# Patient Record
Sex: MALE | Race: WHITE
[De-identification: names, ages, dates, MRNs, and addresses within clinical notes are randomized per-mention and may not be internally consistent; named-entity substitution may affect disease eponyms.]

---

## 2023-04-07 PROBLEM — Z00.00 ENCOUNTER FOR PREVENTIVE HEALTH EXAMINATION: Status: ACTIVE | Noted: 2023-04-07

## 2023-04-10 ENCOUNTER — APPOINTMENT (OUTPATIENT)
Dept: ORTHOPEDIC SURGERY | Facility: CLINIC | Age: 48
End: 2023-04-10
Payer: COMMERCIAL

## 2023-04-10 VITALS — WEIGHT: 230 LBS | HEIGHT: 69 IN | BODY MASS INDEX: 34.07 KG/M2

## 2023-04-10 DIAGNOSIS — Z78.9 OTHER SPECIFIED HEALTH STATUS: ICD-10-CM

## 2023-04-10 PROCEDURE — 73010 X-RAY EXAM OF SHOULDER BLADE: CPT | Mod: RT

## 2023-04-10 PROCEDURE — 73030 X-RAY EXAM OF SHOULDER: CPT | Mod: RT

## 2023-04-10 PROCEDURE — 99204 OFFICE O/P NEW MOD 45 MIN: CPT

## 2023-04-10 RX ORDER — TIRZEPATIDE 12.5 MG/.5ML
12.5 INJECTION, SOLUTION SUBCUTANEOUS
Refills: 0 | Status: ACTIVE | COMMUNITY

## 2023-04-11 NOTE — HISTORY OF PRESENT ILLNESS
[de-identified] : The patient is a 47 year  old right hand dominant male who presents today complaining of right shoulder pain.  \par Date of Injury/Onset: 1/1/23\par Pain:    At Rest: 0/10 \par With Activity:  4-5/10 \par Mechanism of injury: gradual onset, no specific BRANDEN\par This is not a Work Related Injury being treated under Worker's Compensation.\par This is not an athletic injury occurring associated with an interscholastic or organized sports team.\par Quality of symptoms: dull, soreness\par Improves with: ice, rest\par Worse with: tennis, repetitive overhead motions\par Prior treatment: none\par Prior Imaging: none\par Out of work/sport: currently working\par School/Sport/Position/Occupation: physician \par Additional Information: None\par

## 2023-04-11 NOTE — IMAGING
[Right] : right shoulder [There are no fractures, subluxations or dislocations. No significant abnormalities are seen] : There are no fractures, subluxations or dislocations. No significant abnormalities are seen [de-identified] : The patient is a well appearing 47 year  old male of their stated age. \par Neck is supple & nontender to palpation. Negative Spurling's test. \par \par Effected Shoulder: RIGHT \par Inspection: \par Scapula Winging: Negative \par Deformity: None \par Erythema: None \par Ecchymosis: None \par Abrasions: None \par Effusion: None \par \par Range of Motion: \par Active Forward Flexion: 180 degrees  \par Active Abduction: 180 degrees  \par Passive Forward Flexion: 180 degrees  \par Passive Abduction: 180 degrees  \par ER @ 90 degrees: 90 degrees \par IR @ 90 degrees: 25 degrees \par ER @ 0 degrees: 50 degrees \par Motor Exam: \par Forward Flexion: 5 out of 5 \par Flexion Plane of Scapula: 5 out of 5 \par Abduction: 5 out of 5 \par Internal Rotation: 5 out of 5 \par External Rotation: 5 out of 5 \par Distal Motor Strength: 5 out of 5 \par \par Stability Testing: \par Anterior: 1+ \par Posterior: 1+ \par Sulcus N: 1+ \par Sulcus ER: 1+ \par Provocative Tests: \par Drop Arm: Negative \par Impingement: POSITIVE \par [place]: Negative \par X-Arm Adduction: Negative \par Belly Press: Negative \par Bear Hug: Negative \par Lift Off: Negative \par Apprehension: Negative \par Relocation: Negative \par Posterior Load & Shift: Negative \par \par Palpation: \par AC Joint: Nontender \par Clavicle: Nontender \par SC Joint: Nontender \par Bicepital Groove: TENDER \par Coracoid Process: Nontender \par Pectoralis Minor Tendon: TENDER \par Pectoralis Major Tendon: Nontender & palpably intact \par Latissimus Dorsi: Nontender  \par Proximal Humerus: Nontender \par Scapula Body: Nontender \par Medial Scapula Boarder: Nontender \par Scapula Spine: Nontender \par \par Neurologic Exam: Sensation to Light Touch: \par Axillary: Grossly intact \par Ulnar: Grossly intact \par Radial: Grossly intact \par Median: Grossly intact \par Other:  N/A \par Circulatory/Pulses: \par Ulnar: 2+ \par Radial: 2+ \par Other Pertinent Findings: None \par \par Contralateral Shoulder \par Range of Motion: \par Active Forward Flexion: 180 degrees  \par Active Abduction: 180 degrees  \par Passive Forward Flexion: 180 degrees \par Passive Abduction: 180 degrees  \par ER @ 90 degrees: 90 degrees \par IR @ 90 degrees: 45 degrees \par ER @ 0 degrees: 50 degrees \par Motor Exam: \par Forward Flexion: 5 out of 5 \par Flexion Plane of Scapula: 5 out of 5 \par Abduction: 5 out of 5 \par Internal Rotation: 5 out of 5 \par External Rotation: 5 out of 5 \par Distal Motor Strength: 5 out of 5 \par Stability Testing: \par Anterior: 1+ \par Posterior: 1+ \par Sulcus N: 1+ \par Sulcus ER: 1+ \par Other Pertinent Findings: None \par \par Assessment: The patient is a 47 year old male with right shoulder pain and radiographic and physical exam findings consistent with internal impingement syndrome.   \par The patient’s condition is acute \par Documents/Results Reviewed Today: X-Ray right shoulder/scapula \par Tests/Studies Independently Interpreted Today: X-Ray right shoulder/scapula is benign\par Pertinent findings include: IR 25, +impingement, tender bicipital groove, tender pectoralis minor \par Confounding medical conditions/concerns: None\par \par Plan: Patient will start physical therapy, HEP, and stretching. Discussed taking OTC antiinflammatories as needed - use as directed. Modify activity as discussed. \par Tests Ordered: None \par Prescription Medications Ordered: Discussed use of OTC NSAIDs including but not limited to Aleve, Motrin, Tylenol Advil, etc.\par Braces/DME Ordered: None \par Activity/Work/Sports Status: None \par Additional Instructions: None\par Follow-Up: 6 weeks \par  \par The patient's current medication management of their orthopedic diagnosis was reviewed today:\par (1) We discussed a comprehensive treatment plan that included possible pharmaceutical management involving the use of prescription strength medications including but not limited to options such as oral Naprosyn 500mg BID, once daily Meloxicam 15 mg, or 500-650 mg Tylenol versus over the counter oral medications and topical prescription NSAID Pennsaid vs over the counter Voltaren gel.  Based on our extensive discussion, the patient declined prescription medication and will use over the counter Advil, Alleve, Voltaren Gel or Tylenol as directed.\par (2) There is a moderate risk of morbidity with further treatment, especially from use of prescription strength medications and possible side effects of these medications which include upset stomach with oral medications, skin reactions to topical medications and cardiac/renal issues with long term use.\par (3) I recommended that the patient follow-up with their medical physician to discuss any significant specific potential issues with long term medication use such as interactions with current medications or with exacerbation of underlying medical comorbidities.\par (4) The benefits and risks associated with use of injectable, oral or topical, prescription and over the counter anti-inflammatory medications were discussed with the patient. The patient voiced understanding of the risks including but not limited to bleeding, stroke, kidney dysfunction, heart disease, and were referred to the black box warning label for further information. \par \par Dominique ROMERO attest that this documentation has been prepared under the direction and in the presence of Provider Dr. Vikram Steinberg. \par \par The documentation recorded by the scribe accurately reflects the services I, Dr. Vikram Steinberg, personally performed and the decisions made by me.\par  [FreeTextEntry1] : X-Ray right shoulder/scapula is benign

## 2023-07-12 ENCOUNTER — NON-APPOINTMENT (OUTPATIENT)
Age: 48
End: 2023-07-12

## 2023-07-17 ENCOUNTER — FORM ENCOUNTER (OUTPATIENT)
Age: 48
End: 2023-07-17

## 2023-07-18 ENCOUNTER — APPOINTMENT (OUTPATIENT)
Dept: MRI IMAGING | Facility: CLINIC | Age: 48
End: 2023-07-18
Payer: COMMERCIAL

## 2023-07-18 DIAGNOSIS — M75.41 IMPINGEMENT SYNDROME OF RIGHT SHOULDER: ICD-10-CM

## 2023-07-18 DIAGNOSIS — M25.511 PAIN IN RIGHT SHOULDER: ICD-10-CM

## 2023-07-18 PROCEDURE — 73221 MRI JOINT UPR EXTREM W/O DYE: CPT | Mod: RT

## 2023-07-21 ENCOUNTER — TRANSCRIPTION ENCOUNTER (OUTPATIENT)
Age: 48
End: 2023-07-21

## 2025-04-30 ENCOUNTER — APPOINTMENT (OUTPATIENT)
Dept: ORTHOPEDIC SURGERY | Facility: CLINIC | Age: 50
End: 2025-04-30
Payer: COMMERCIAL

## 2025-04-30 VITALS — HEIGHT: 69 IN | WEIGHT: 230 LBS | BODY MASS INDEX: 34.07 KG/M2

## 2025-04-30 DIAGNOSIS — M70.61 TROCHANTERIC BURSITIS, RIGHT HIP: ICD-10-CM

## 2025-04-30 PROCEDURE — J3490M: CUSTOM

## 2025-04-30 PROCEDURE — 99213 OFFICE O/P EST LOW 20 MIN: CPT | Mod: 25

## 2025-04-30 PROCEDURE — 73503 X-RAY EXAM HIP UNI 4/> VIEWS: CPT | Mod: RT

## 2025-04-30 PROCEDURE — 99204 OFFICE O/P NEW MOD 45 MIN: CPT | Mod: 25

## 2025-04-30 PROCEDURE — 20610 DRAIN/INJ JOINT/BURSA W/O US: CPT | Mod: RT
